# Patient Record
Sex: FEMALE | Race: WHITE | NOT HISPANIC OR LATINO | Employment: FULL TIME | URBAN - METROPOLITAN AREA
[De-identification: names, ages, dates, MRNs, and addresses within clinical notes are randomized per-mention and may not be internally consistent; named-entity substitution may affect disease eponyms.]

---

## 2019-03-22 ENCOUNTER — OFFICE VISIT (OUTPATIENT)
Dept: URGENT CARE | Facility: CLINIC | Age: 29
End: 2019-03-22

## 2019-03-22 VITALS
SYSTOLIC BLOOD PRESSURE: 108 MMHG | TEMPERATURE: 99 F | WEIGHT: 220 LBS | HEIGHT: 68 IN | DIASTOLIC BLOOD PRESSURE: 71 MMHG | BODY MASS INDEX: 33.34 KG/M2 | RESPIRATION RATE: 20 BRPM | HEART RATE: 72 BPM | OXYGEN SATURATION: 100 %

## 2019-03-22 DIAGNOSIS — F31.9 BIPOLAR AFFECTIVE DISORDER, REMISSION STATUS UNSPECIFIED: Primary | ICD-10-CM

## 2019-03-22 DIAGNOSIS — O09.91 HIGH-RISK PREGNANCY IN FIRST TRIMESTER: ICD-10-CM

## 2019-03-22 DIAGNOSIS — B34.9 VIRAL SYNDROME: ICD-10-CM

## 2019-03-22 DIAGNOSIS — Z3A.01 LESS THAN 8 WEEKS GESTATION OF PREGNANCY: ICD-10-CM

## 2019-03-22 PROCEDURE — 99204 PR OFFICE/OUTPT VISIT, NEW, LEVL IV, 45-59 MIN: ICD-10-PCS | Mod: S$GLB,,, | Performed by: INTERNAL MEDICINE

## 2019-03-22 PROCEDURE — 99204 OFFICE O/P NEW MOD 45 MIN: CPT | Mod: S$GLB,,, | Performed by: INTERNAL MEDICINE

## 2019-03-22 RX ORDER — LITHIUM CARBONATE 300 MG/1
300 CAPSULE ORAL 2 TIMES DAILY WITH MEALS
COMMUNITY
End: 2019-03-22 | Stop reason: SDUPTHER

## 2019-03-22 RX ORDER — OXCARBAZEPINE 150 MG/1
150 TABLET, FILM COATED ORAL 2 TIMES DAILY
Qty: 60 TABLET | Refills: 0 | Status: SHIPPED | OUTPATIENT
Start: 2019-03-22

## 2019-03-22 RX ORDER — LITHIUM CARBONATE 300 MG/1
300 CAPSULE ORAL 2 TIMES DAILY WITH MEALS
Qty: 60 CAPSULE | Refills: 0 | Status: SHIPPED | OUTPATIENT
Start: 2019-03-22

## 2019-03-22 RX ORDER — FOLIC ACID 1 MG/1
1 TABLET ORAL 4 TIMES DAILY
COMMUNITY

## 2019-03-22 RX ORDER — OXCARBAZEPINE 150 MG/1
150 TABLET, FILM COATED ORAL 2 TIMES DAILY
COMMUNITY
End: 2019-03-22 | Stop reason: SDUPTHER

## 2019-03-22 NOTE — PROGRESS NOTES
"Subjective:       Patient ID: Debra Foley is a 28 y.o. female.    Vitals:  height is 5' 8" (1.727 m) and weight is 99.8 kg (220 lb). Her tympanic temperature is 98.6 °F (37 °C). Her blood pressure is 108/71 and her pulse is 72. Her respiration is 20 and oxygen saturation is 100%.     Chief Complaint: Sinus Problem    29 y/o pregnant female new to me presents with c/o stuffy nose yesterday followed by body aches. Reports having a ton of pressure in her face. Reports she also has sore throat, a little better now. Trying to blow but little is moving. Coughing but nothing coming up.     Also reports last dose of meds is today.       Sinus Problem   This is a new problem. The current episode started today. The problem is unchanged. There has been no fever. Her pain is at a severity of 4/10 (Body Aches). Associated symptoms include chills, congestion, coughing, sinus pressure, sneezing and a sore throat. Pertinent negatives include no diaphoresis, ear pain, neck pain or shortness of breath. Past treatments include nothing.       Constitution: Positive for chills. Negative for sweating, fatigue and fever.   HENT: Positive for congestion, sinus pressure and sore throat. Negative for ear pain, sinus pain and voice change.    Neck: Negative for neck pain and painful lymph nodes.   Eyes: Negative for eye redness.   Respiratory: Positive for cough. Negative for chest tightness, sputum production, bloody sputum, COPD, shortness of breath, stridor, wheezing and asthma.    Gastrointestinal: Positive for nausea and vomiting. Negative for abdominal trauma, abdominal pain, abdominal bloating and diarrhea.   Genitourinary: Negative for vaginal pain, vaginal discharge and vaginal bleeding.   Musculoskeletal: Negative for muscle ache.   Skin: Negative for rash.   Allergic/Immunologic: Positive for sneezing. Negative for seasonal allergies and asthma.   Hematologic/Lymphatic: Negative for swollen lymph nodes.       Objective:    "   Physical Exam   Constitutional: She is oriented to person, place, and time. She appears well-developed and well-nourished.   HENT:   Head: Normocephalic and atraumatic.   Right Ear: External ear normal.   Left Ear: External ear normal.   Nose: Nose normal.   Mouth/Throat: Oropharynx is clear and moist. No oropharyngeal exudate.   Eyes: Pupils are equal, round, and reactive to light.   Cardiovascular: Normal rate, regular rhythm and normal heart sounds.   Pulmonary/Chest: Effort normal and breath sounds normal.   Abdominal: Soft. Bowel sounds are normal. She exhibits no distension and no mass. There is no tenderness. There is no rebound and no guarding. No hernia.   Musculoskeletal: She exhibits no edema.   Neurological: She is alert and oriented to person, place, and time.   Skin: Skin is warm and dry.   Psychiatric: She has a normal mood and affect. Her behavior is normal. Judgment and thought content normal.   Seems of stable mental state at present.    Nursing note and vitals reviewed.      Assessment:       1. Bipolar affective disorder, remission status unspecified    2. Less than 8 weeks gestation of pregnancy    3. Viral syndrome    4. High-risk pregnancy in first trimester        Plan:       1. Bipolar affective disorder, remission status unspecified  Consulted with psychology. Both have risk in pregnancy. Reports she will be high risk pregnancy. Advised to refill x 30 days and arrange mental health eval next week. Do not want to risk rapid withdrawal and unsure how stable mental health wise. May eventually be able to get off meds, but needs MH eval to decide this. Advised pt on this, pt v/u.   - lithium (ESKALITH) 300 MG capsule; Take 1 capsule (300 mg total) by mouth 2 (two) times daily with meals.  Dispense: 60 capsule; Refill: 0  - OXcarbazepine (TRILEPTAL) 150 MG Tab; Take 1 tablet (150 mg total) by mouth 2 (two) times daily.  Dispense: 60 tablet; Refill: 0    2. Less than 8 weeks gestation of  pregnancy  Advised pt on risk of continuing meds during pregnancy and the fact that she will need to see psych asap. Also advised on need for lithium levels and the fact that they can go up and down based on hormones and intake/nausea.     3. Viral syndrome  Advised that no treatment needed. Rest, fluids, saline nasal spray.     4. High-risk pregnancy in first trimester  See above.

## 2019-03-22 NOTE — PATIENT INSTRUCTIONS
"  No ibuprofen, just tylenol.     Viral Syndrome (Adult)  A viral illness may cause a number of symptoms. The symptoms depend on the part of the body that the virus affects. If it settles in your nose, throat, and lungs, it may cause cough, sore throat, congestion, and sometimes headache. If it settles in your stomach and intestinal tract, it may cause vomiting and diarrhea. Sometimes it causes vague symptoms like "aching all over," feeling tired, loss of appetite, or fever.  A viral illness usually lasts 1 to 2 weeks, but sometimes it lasts longer. In some cases, a more serious infection can look like a viral syndrome in the first few days of the illness. You may need another exam and additional tests to know the difference. Watch for the warning signs listed below.  Home care  Follow these guidelines for taking care of yourself at home:  · If symptoms are severe, rest at home for the first 2 to 3 days.  · Stay away from cigarette smoke - both your smoke and the smoke from others.  · You may use over-the-counter acetaminophen or ibuprofen for fever, muscle aching, and headache, unless another medicine was prescribed for this. If you have chronic liver or kidney disease or ever had a stomach ulcer or GI bleeding, talk with your doctor before using these medicines. No one who is younger than 18 and ill with a fever should take aspirin. It may cause severe disease or death.  · Your appetite may be poor, so a light diet is fine. Avoid dehydration by drinking 8 to 12 8-ounce glasses of fluids each day. This may include water; orange juice; lemonade; apple, grape, and cranberry juice; clear fruit drinks; electrolyte replacement and sports drinks; and decaffeinated teas and coffee. If you have been diagnosed with a kidney disease, ask your doctor how much and what types of fluids you should drink to prevent dehydration. If you have kidney disease, drinking too much fluid can cause it build up in the your body and be " dangerous to your health.  · Over-the-counter remedies won't shorten the length of the illness but may be helpful for cough, sore throat; and nasal and sinus congestion. Don't use decongestants if you have high blood pressure.  Follow-up care  Follow up with your healthcare provider if you do not improve over the next week.  Call 911  Get emergency medical care if any of the following occur:  · Convulsion  · Feeling weak, dizzy, or like you are going to faint  · Chest pain, shortness of breath, wheezing, or difficulty breathing  When to seek medical advice  Call your healthcare provider right away if any of these occur:  · Cough with lots of colored sputum (mucus) or blood in your sputum  · Chest pain, shortness of breath, wheezing, or difficulty breathing  · Severe headache; face, neck, or ear pain  · Severe, constant pain in the lower right side of your belly (abdominal)  · Continued vomiting (cant keep liquids down)  · Frequent diarrhea (more than 5 times a day); blood (red or black color) or mucus in diarrhea  · Feeling weak, dizzy, or like you are going to faint  · Extreme thirst  · Fever of 100.4°F (38°C) or higher, or as directed by your healthcare provider  Date Last Reviewed: 9/25/2015  © 8372-0644 [employer]. [address] [place] [address], [place] [address]. All rights reserved. This information is not intended as a substitute for professional medical care. Always follow your healthcare professional's instructions.

## 2019-03-25 ENCOUNTER — TELEPHONE (OUTPATIENT)
Dept: URGENT CARE | Facility: CLINIC | Age: 29
End: 2019-03-25

## 2019-04-27 DIAGNOSIS — F31.9 BIPOLAR AFFECTIVE DISORDER, REMISSION STATUS UNSPECIFIED: ICD-10-CM

## 2019-04-29 RX ORDER — LITHIUM CARBONATE 300 MG/1
CAPSULE ORAL
Qty: 60 CAPSULE | Refills: 0 | OUTPATIENT
Start: 2019-04-29

## 2019-04-29 RX ORDER — OXCARBAZEPINE 150 MG/1
TABLET, FILM COATED ORAL
Qty: 60 TABLET | Refills: 0 | OUTPATIENT
Start: 2019-04-29